# Patient Record
Sex: FEMALE | URBAN - METROPOLITAN AREA
[De-identification: names, ages, dates, MRNs, and addresses within clinical notes are randomized per-mention and may not be internally consistent; named-entity substitution may affect disease eponyms.]

---

## 2024-03-26 ENCOUNTER — OV NP (OUTPATIENT)
Dept: URBAN - METROPOLITAN AREA CLINIC 35 | Facility: CLINIC | Age: 89
End: 2024-03-26

## 2024-03-26 NOTE — HPI-TODAY'S VISIT:
89  year old female patient with previous history of __ presents today for consultation about __.     Since __ with __ episodes per __ that is located __ with/without irradiation.   Patient describes it as a X/10 VAS __(type) __ pain.     Patient admits no change/worsening/improvement/relief of symptoms with (medication).     Denies/Admits had previous labs, radiology, procedures, use of antibiotics within the last 6 months.  Denies/Admits any hospital/ER visits for their complaint.     Denies/Admits a family history of esophageal, gastric, colon or rectal cancer or diseases.  Denies/Admits a past Colonoscopy/EGD (when, results).     Patient currently  admits __ bowel movements per day with __ consistency with/without pain, melena, blood, or mucus.